# Patient Record
Sex: FEMALE | Race: OTHER | Employment: OTHER | URBAN - METROPOLITAN AREA
[De-identification: names, ages, dates, MRNs, and addresses within clinical notes are randomized per-mention and may not be internally consistent; named-entity substitution may affect disease eponyms.]

---

## 2018-12-17 NOTE — PATIENT DISCUSSION
Monitor for IOP and NFL changes with visits and OCTs. RTC in 1 year for Exam and OCT ONH, sooner if problems or changes occur.

## 2018-12-17 NOTE — PATIENT DISCUSSION
Monitor for changes. Advised patient of condition of cataracts and discussed symptoms of cataracts worsening and becoming more bothersome. Pt to call if vision changes or becomes more bothered by visual symptoms before next appt.

## 2020-12-14 NOTE — PATIENT DISCUSSION
Monitor for changes.  Pt to call if vision changes or becomes more bothered by visual symptoms before next appt.

## 2021-12-01 ENCOUNTER — NEW PATIENT (OUTPATIENT)
Dept: URBAN - METROPOLITAN AREA CLINIC 39 | Facility: CLINIC | Age: 66
End: 2021-12-01

## 2021-12-01 DIAGNOSIS — H04.123: ICD-10-CM

## 2021-12-01 DIAGNOSIS — H25.813: ICD-10-CM

## 2021-12-01 DIAGNOSIS — H40.033: ICD-10-CM

## 2021-12-01 DIAGNOSIS — H40.1113: ICD-10-CM

## 2021-12-01 DIAGNOSIS — H40.1121: ICD-10-CM

## 2021-12-01 PROCEDURE — 99204 OFFICE O/P NEW MOD 45 MIN: CPT

## 2021-12-01 PROCEDURE — 92250 FUNDUS PHOTOGRAPHY W/I&R: CPT

## 2021-12-01 PROCEDURE — 76514 ECHO EXAM OF EYE THICKNESS: CPT

## 2021-12-01 PROCEDURE — 92020 GONIOSCOPY: CPT

## 2021-12-01 ASSESSMENT — VISUAL ACUITY
OD_SC: <J12
OD_CC: J10
OS_CC: J1
OS_SC: J10
OS_SC: 20/20-2
OD_SC: 20/70-2

## 2021-12-01 ASSESSMENT — PACHYMETRY
OD_CT_UM: 529
OS_CT_UM: 527

## 2021-12-01 ASSESSMENT — TONOMETRY
OS_IOP_MMHG: 19
OD_IOP_MMHG: 18

## 2022-03-09 ENCOUNTER — FOLLOW UP (OUTPATIENT)
Dept: URBAN - METROPOLITAN AREA CLINIC 39 | Facility: CLINIC | Age: 67
End: 2022-03-09

## 2022-03-09 DIAGNOSIS — H40.1121: ICD-10-CM

## 2022-03-09 DIAGNOSIS — H40.1113: ICD-10-CM

## 2022-03-09 PROCEDURE — 92133 CPTRZD OPH DX IMG PST SGM ON: CPT

## 2022-03-09 PROCEDURE — 92202 OPSCPY EXTND ON/MAC DRAW: CPT

## 2022-03-09 PROCEDURE — 92012 INTRM OPH EXAM EST PATIENT: CPT

## 2022-03-09 ASSESSMENT — VISUAL ACUITY
OD_PH: 20/50-2
OS_SC: 20/20-2
OD_SC: 20/60-2

## 2022-03-09 ASSESSMENT — TONOMETRY
OD_IOP_MMHG: 18
OS_IOP_MMHG: 16